# Patient Record
Sex: MALE | Race: WHITE | Employment: FULL TIME | URBAN - METROPOLITAN AREA
[De-identification: names, ages, dates, MRNs, and addresses within clinical notes are randomized per-mention and may not be internally consistent; named-entity substitution may affect disease eponyms.]

---

## 2022-08-02 NOTE — PROGRESS NOTES
HPI: Mirlande Jason (: 1960) is a 63 y.o. male, patient, here for evaluation of the following chief complaint(s):    No chief complaint on file. Patient is seen today to evaluate his left hand. The patient has a history of Dupuytren contracture. He is undergone a right small finger palmar digital fasciectomy about 7 to 8 years ago. He has a 15 degree MP and 20 to 25 degree PIP contracture of the left small finger. He has a thickened pretendinous cord with nodule formation to the small finger ray. He this does affect his ability to place his hand flat on the tabletop surface and at times he has trouble placing his hand in a pocket or in double glove. He denies any fall or injury or any triggering. Vitals: There were no vitals taken for this visit. There is no height or weight on file to calculate BMI. Not on File    No current outpatient medications on file. No current facility-administered medications for this visit. No past medical history on file. No past surgical history on file. No family history on file. Review of Systems   All other systems reviewed and are negative. Physical Exam    Patient demonstrates good elbow, forearm, wrist and hand range of motion. He has the slightest residual contracture involving the right small finger where he had prior surgery 7 to 8 years ago. Overall good result. Left small finger had developed cord formation with 15 degree MP and 20 to 25 degree PIP contracture. And this occurred after having a collagenase injection in . Imaging:    XR Results (most recent):  No results found for this or any previous visit. ASSESSMENT/PLAN:  Below is the assessment and plan developed based on review of pertinent history, physical exam, labs, studies, and medications. Patient's examination was consistent with Dupuytren contracture involving the left more than the right small finger.   He had a collagenase injection for the left small finger about a year and a half ago in 2021. He had also had a right small finger palmar digital fasciectomy 7-8 years ago that was more successful. The left small finger has effectively recurred. I reviewed treatment options and answered his questions. He deferred repeat collagenase and prefers now to proceed with a left small finger palmar digital fasciectomy with joint release. I reviewed risks that include but not limited to stiffness, pain, nerve or tendon damage and incomplete relief of pain and stiffness. Arrangements can made for this to be performed in outpatient basis soon as possible. 1. Dupuytren's contracture of right hand      No follow-ups on file. An electronic signature was used to authenticate this note.   -- Petra Beck MD

## 2022-08-03 ENCOUNTER — OFFICE VISIT (OUTPATIENT)
Dept: ORTHOPEDIC SURGERY | Age: 62
End: 2022-08-03
Payer: COMMERCIAL

## 2022-08-03 DIAGNOSIS — M72.0 DUPUYTREN'S CONTRACTURE OF LEFT HAND: Primary | ICD-10-CM

## 2022-08-03 PROCEDURE — 99213 OFFICE O/P EST LOW 20 MIN: CPT | Performed by: ORTHOPAEDIC SURGERY

## 2022-08-03 RX ORDER — LOSARTAN POTASSIUM 25 MG/1
TABLET ORAL 2 TIMES DAILY
COMMUNITY

## 2022-08-03 NOTE — PATIENT INSTRUCTIONS
Dupuytren's Contracture: Care Instructions  Your Care Instructions     In Dupuytren's contracture, the fingers become stiff and curl toward the palm. It is caused by thick tissue that grows under the skin in the palm of the hand. Sometimes the condition affects the palm but not the fingers. If the tissue gets thicker and affects one or more fingers, it may limit movement of your fingers and hand. Sometimes the condition can occur in the soles of the feet. The cause of Dupuytren's disease is not known. Dupuytren's disease may get worse slowly. If you have mild Dupuytren's disease, you may be able to keep your fingers moving with regular stretching. Surgery usually helps in severe cases. However, Dupuytren's disease can come back. Follow-up care is a key part of your treatment and safety. Be sure to make and go to all appointments, and call your doctor if you are having problems. It's also a good idea to know your test results and keep a list of the medicines you take. How can you care for yourself at home? Follow your doctor's advice for physical or occupational therapy and exercises to put your fingers and hand through a range of motion. Two times a day, massage your hand and gently stretch the fingers back. This can get rid of tightness and help keep your fingers flexible. Try to avoid curling your hand tightly. For example, use utensils and tools that have larger hand . When should you call for help? Call your doctor now or seek immediate medical care if:    You have numbness in your fingers. You have a wound or sore on your finger or palm. Your hand or fingers get worse. Watch closely for changes in your health, and be sure to contact your doctor if you have any problems. Where can you learn more? Go to http://www.gray.com/  Enter T888 in the search box to learn more about \"Dupuytren's Contracture: Care Instructions. \"  Current as of: July 1, 2021 Content Version: 13.2  © 8205-5992 [employer]. Care instructions adapted under license by [employer] (which disclaims liability or warranty for this information). If you have questions about a medical condition or this instruction, always ask your healthcare professional. [address] any warranty or liability for your use of this information.

## 2022-08-10 DIAGNOSIS — M72.0 DUPUYTREN'S CONTRACTURE OF LEFT HAND: Primary | ICD-10-CM

## 2022-08-26 DIAGNOSIS — M72.0 DUPUYTREN'S CONTRACTURE OF LEFT HAND: Primary | ICD-10-CM

## 2022-08-26 RX ORDER — HYDROCODONE BITARTRATE AND ACETAMINOPHEN 5; 325 MG/1; MG/1
1 TABLET ORAL
Qty: 15 TABLET | Refills: 0 | Status: SHIPPED | OUTPATIENT
Start: 2022-08-26 | End: 2022-08-29

## 2022-08-31 NOTE — PROGRESS NOTES
HPI: Natali Zhou (: 1960) is a 63 y.o. male, patient, here for evaluation of the following chief complaint(s):    No chief complaint on file. Patient is seen today to evaluate his left hand. The patient has a history of Dupuytren contracture. He is undergone a right small finger palmar digital fasciectomy about 7 to 8 years ago. He has a 15 degree MP and 20 to 25 degree PIP contracture of the left small finger. He has a thickened pretendinous cord with nodule formation to the small finger ray. He this does affect his ability to place his hand flat on the tabletop surface and at times he has trouble placing his hand in a pocket or in double glove. He denies any fall or injury or any triggering. He underwent a left palmar digital fasciectomy small finger ray including MP and PIP joint releases on 2022. Vitals: There were no vitals taken for this visit. There is no height or weight on file to calculate BMI. No Known Allergies    Current Outpatient Medications   Medication Sig    losartan (COZAAR) 25 mg tablet Take  by mouth two (2) times a day. No current facility-administered medications for this visit. No past medical history on file. No past surgical history on file. No family history on file. Review of Systems   All other systems reviewed and are negative. Physical Exam    He has had the slightest residual contracture involving the right small finger where he had prior surgery 7 to 8 years ago. Overall good result. Left small finger had developed cord formation with 15 degree MP and 20 to 25 degree PIP contracture. And this occurred after having a collagenase injection in . Postoperatively the left small finger wound is healing well and there is full extension of the MP and PIP joints. There is good sensation refill. Good alignment.     Imaging:    XR Results (most recent):  No results found for this or any previous visit.        ASSESSMENT/PLAN:  Below is the assessment and plan developed based on review of pertinent history, physical exam, labs, studies, and medications. Patient's examination was consistent with Dupuytren contracture involving the left more than the right small finger. He had a collagenase injection for the left small finger about a year and a half ago in 2021. He had also had a right small finger palmar digital fasciectomy 7-8 years ago that was more successful. The left small finger has effectively recurred. I reviewed treatment options and answered his questions. He deferred repeat collagenase and prefers now to proceed with a left small finger palmar digital fasciectomy with joint release. He underwent a left palmar digital fasciectomy small finger ray including MP and PIP joint releases on 8/29/2022. He may continue with nighttime splinting and daytime motion then strength recovery. New gauze dressing and Ace bandage was applied and there is good small finger resting extension. Follow-up in 10 to 14 days for wound assessment suture removal.  He will be out of work currently planned until around 8/19/2022      1. Dupuytren's contracture of left hand  2. Status post Dupuytren's fasciectomy      No follow-ups on file. An electronic signature was used to authenticate this note.   -- Chyna Hernandez MD

## 2022-09-01 ENCOUNTER — OFFICE VISIT (OUTPATIENT)
Dept: ORTHOPEDIC SURGERY | Age: 62
End: 2022-09-01
Payer: COMMERCIAL

## 2022-09-01 DIAGNOSIS — M72.0 DUPUYTREN'S CONTRACTURE OF LEFT HAND: Primary | ICD-10-CM

## 2022-09-01 DIAGNOSIS — Z98.890 STATUS POST DUPUYTREN'S FASCIECTOMY: ICD-10-CM

## 2022-09-01 PROCEDURE — 99024 POSTOP FOLLOW-UP VISIT: CPT | Performed by: ORTHOPAEDIC SURGERY

## 2022-09-01 NOTE — LETTER
9/1/2022    Patient: Rylie Michael   YOB: 1960   Date of Visit: 9/1/2022     Juve Mcdonald MD  [place] [address]  Via Fax: [phone]    Dear Juve Mcdonald MD,      Thank you for referring Mr. Rylie Michael to [facility] for evaluation. My notes for this consultation are attached. If you have questions, please do not hesitate to call me. I look forward to following your patient along with you.       Sincerely,    Mahamed Solano MD

## 2022-09-13 NOTE — PROGRESS NOTES
HPI: Ignacia Witt (: 1960) is a 63 y.o. male, patient, here for evaluation of the following chief complaint(s):    Surgical Follow-up (Left palmar digital fasciectomy small finger with MP and PIP joint releases on 22)  Patient is seen today to evaluate his left hand. The patient has a history of Dupuytren contracture. He is undergone a right small finger palmar digital fasciectomy about 7 to 8 years ago. He has a 15 degree MP and 20 to 25 degree PIP contracture of the left small finger. He has a thickened pretendinous cord with nodule formation to the small finger ray. He this does affect his ability to place his hand flat on the tabletop surface and at times he has trouble placing his hand in a pocket or in double glove. He denies any fall or injury or any triggering. He underwent a left palmar digital fasciectomy small finger ray including MP and PIP joint releases on 2022. He underwent suture removal on 2022. Vitals: There were no vitals taken for this visit. There is no height or weight on file to calculate BMI. No Known Allergies    Current Outpatient Medications   Medication Sig    losartan (COZAAR) 25 mg tablet Take  by mouth two (2) times a day. No current facility-administered medications for this visit. History reviewed. No pertinent past medical history. History reviewed. No pertinent surgical history. History reviewed. No pertinent family history. Review of Systems   All other systems reviewed and are negative. Physical Exam    He has had the slightest residual contracture involving the right small finger where he had prior surgery 7 to 8 years ago. Overall good result. Left small finger had developed cord formation with 15 degree MP and 20 to 25 degree PIP contracture. And this occurred after having a collagenase injection in .       Postoperatively the left small finger wound is healing well and there is full extension of the MP and PIP joints. There is good sensation refill. Good alignment. Imaging:    XR Results (most recent):  No new x-rays today. ASSESSMENT/PLAN:  Below is the assessment and plan developed based on review of pertinent history, physical exam, labs, studies, and medications. Patient's examination was consistent with Dupuytren contracture involving the left more than the right small finger. He had a collagenase injection for the left small finger about a year and a half ago in 2021. He had also had a right small finger palmar digital fasciectomy 7-8 years ago that was more successful. The left small finger has effectively recurred. I reviewed treatment options and answered his questions. He deferred repeat collagenase and prefers now to proceed with a left small finger palmar digital fasciectomy with joint release. He underwent a left palmar digital fasciectomy small finger ray including MP and PIP joint releases on 8/29/2022. He may continue with nighttime splinting and daytime motion then strength recovery. He return for suture removal on 9/14/2022. He may continue with scar massage, desensitization, motion and strengthening. Overall he has good extension and flexion is pleased with his recovery. He deferred the need for outpatient therapy. He is returning to work on 9/19/2022. Follow-up in 3 to 4 weeks    1. Dupuytren's contracture of left hand  2. Status post Dupuytren's fasciectomy      Return in about 4 weeks (around 10/12/2022). An electronic signature was used to authenticate this note.   -- Gustabo Higgins MD

## 2022-09-14 ENCOUNTER — OFFICE VISIT (OUTPATIENT)
Dept: ORTHOPEDIC SURGERY | Age: 62
End: 2022-09-14
Payer: COMMERCIAL

## 2022-09-14 ENCOUNTER — OFFICE VISIT (OUTPATIENT)
Dept: ORTHOPEDIC SURGERY | Age: 62
End: 2022-09-14

## 2022-09-14 VITALS — HEIGHT: 70 IN | BODY MASS INDEX: 29.35 KG/M2 | WEIGHT: 205 LBS

## 2022-09-14 DIAGNOSIS — M72.0 DUPUYTREN'S CONTRACTURE OF LEFT HAND: Primary | ICD-10-CM

## 2022-09-14 DIAGNOSIS — M25.512 LEFT SHOULDER PAIN, UNSPECIFIED CHRONICITY: Primary | ICD-10-CM

## 2022-09-14 DIAGNOSIS — M75.42 IMPINGEMENT SYNDROME OF LEFT SHOULDER: ICD-10-CM

## 2022-09-14 DIAGNOSIS — Z98.890 STATUS POST DUPUYTREN'S FASCIECTOMY: ICD-10-CM

## 2022-09-14 PROCEDURE — 99214 OFFICE O/P EST MOD 30 MIN: CPT | Performed by: ORTHOPAEDIC SURGERY

## 2022-09-14 PROCEDURE — 20610 DRAIN/INJ JOINT/BURSA W/O US: CPT | Performed by: ORTHOPAEDIC SURGERY

## 2022-09-14 PROCEDURE — 99024 POSTOP FOLLOW-UP VISIT: CPT | Performed by: ORTHOPAEDIC SURGERY

## 2022-09-14 RX ORDER — TRIAMCINOLONE ACETONIDE 40 MG/ML
40 INJECTION, SUSPENSION INTRA-ARTICULAR; INTRAMUSCULAR ONCE
Status: COMPLETED | OUTPATIENT
Start: 2022-09-14 | End: 2022-09-14

## 2022-09-14 RX ORDER — BUPIVACAINE HYDROCHLORIDE 7.5 MG/ML
3 INJECTION, SOLUTION EPIDURAL; RETROBULBAR ONCE
Status: COMPLETED | OUTPATIENT
Start: 2022-09-14 | End: 2022-09-14

## 2022-09-14 RX ADMIN — BUPIVACAINE HYDROCHLORIDE 22.5 MG: 7.5 INJECTION, SOLUTION EPIDURAL; RETROBULBAR at 09:47

## 2022-09-14 RX ADMIN — TRIAMCINOLONE ACETONIDE 40 MG: 40 INJECTION, SUSPENSION INTRA-ARTICULAR; INTRAMUSCULAR at 09:47

## 2022-09-14 NOTE — LETTER
9/14/2022    Patient: Olga Miner   YOB: 1960   Date of Visit: 9/14/2022     Som Steward MD  [place] [address]  Via Fax: [phone]    Dear Som Steward MD,      Thank you for referring Mr. Olga Miner to [facility] for evaluation. My notes for this consultation are attached. If you have questions, please do not hesitate to call me. I look forward to following your patient along with you.       Sincerely,    Williams Xiong, DO

## 2022-09-14 NOTE — LETTER
9/14/2022    Patient: Natali Anthony   YOB: 1960   Date of Visit: 9/14/2022     Dee Maurice MD  [place] [address]  Via Fax: [phone]    Dear Dee Maurice MD,      Thank you for referring Mr. Natali Anthony to [facility] for evaluation. My notes for this consultation are attached. If you have questions, please do not hesitate to call me. I look forward to following your patient along with you.       Sincerely,    Aida Hollis MD

## 2022-09-14 NOTE — PROGRESS NOTES
Priscilla Waddell (: 1960) is a 63 y.o. male, established patient, here for evaluation of the following chief complaint(s):  Shoulder Pain       ASSESSMENT/PLAN:  Below is the assessment and plan developed based on review of pertinent history, physical exam, labs, studies, and medications. We discussed his left shoulder pain. He does have true impingement symptoms although I cannot rule out partial rotator cuff tear. We discussed possibility of MRI if he continues with symptoms despite conservative treatment. For today he elected to try corticosteroid injection and will try a trial of physical therapy if he continues with pain. We discussed the risks and benefits of injection and informed consent was obtained. After a sterile preparation, 4 cc of bupivicaine and 40 mg of Kenalog were injected into the left shoulder. The patient tolerated the procedure well and there were no complications. Post injection pain, blood sugar elevation, skin discoloration, fatty atrophy and the signs of infection were discussed in detail. The patient was instructed to contact us if there were any questions or concerns prior to their follow up appointment. 1. Left shoulder pain, unspecified chronicity  -     XR SHOULDER LT AP/LAT MIN 2 V; Future  -     REFERRAL TO PHYSICAL THERAPY  -     triamcinolone acetonide (KENALOG-40) 40 mg/mL injection 40 mg; 40 mg, Intra artICUlar, ONCE, 1 dose, On 22 at 1000  -     bupivacaine (PF) (MARCAINE) 0.75 % (7.5 mg/mL) injection 22.5 mg; 22.5 mg (3 mL), Intra artICUlar, ONCE, 1 dose, On 22 at 1000  2. Impingement syndrome of left shoulder  -     REFERRAL TO PHYSICAL THERAPY      Return in about 6 weeks (around 10/26/2022), or if symptoms worsen or fail to improve. SUBJECTIVE/OBJECTIVE:  Priscilla Waddell (: 1960) is a 63 y.o. male.     He notes aching pain and occasional sharp pains in the left shoulder that have been ongoing since a recent hand surgery. He believes it may have been due to positioning of the hand while he was getting nerve block. He notes that certain motions especially reaching behind his back causes sharp pain. He has had occasional weakness and clicking        No Known Allergies    Current Outpatient Medications   Medication Sig    losartan (COZAAR) 25 mg tablet Take  by mouth two (2) times a day. Current Facility-Administered Medications   Medication    triamcinolone acetonide (KENALOG-40) 40 mg/mL injection 40 mg    bupivacaine (PF) (MARCAINE) 0.75 % (7.5 mg/mL) injection 22.5 mg       Social History     Socioeconomic History    Marital status:      Spouse name: Not on file    Number of children: Not on file    Years of education: Not on file    Highest education level: Not on file   Occupational History    Not on file   Tobacco Use    Smoking status: Not on file    Smokeless tobacco: Not on file   Substance and Sexual Activity    Alcohol use: Not on file    Drug use: Not on file    Sexual activity: Not on file   Other Topics Concern    Not on file   Social History Narrative    Not on file     Social Determinants of Health     Financial Resource Strain: Not on file   Food Insecurity: Not on file   Transportation Needs: Not on file   Physical Activity: Not on file   Stress: Not on file   Social Connections: Not on file   Intimate Partner Violence: Not on file   Housing Stability: Not on file       History reviewed. No pertinent surgical history. History reviewed. No pertinent family history. REVIEW OF SYSTEMS:  ROS    Positive for: Musculoskeletal  Last edited by Zulay Stockton on 9/14/2022  9:16 AM.        Patient denies any recent fever, chills, nausea, vomiting, chest pain, or shortness of breath. Vitals:  Ht 5' 10\" (1.778 m)   Wt 205 lb (93 kg)   BMI 29.41 kg/m²    Body mass index is 29.41 kg/m². PHYSICAL EXAM:  General exam: Patient is awake, alert, and oriented x3. Well-appearing.   No acute distress. Ambulates with a normal gait. Left shoulder: Neurovascular and sensory intact. There is tenderness to palpation at the anterior lateral shoulder. There is limited passive and active overhead range of motion. Pain is noted with impingement testing including Arriola exam.  Pain and weakness 4/5 is noted with rotator cuff strength testing including resisted abduction and resisted external rotation. Normal stability. There is some tenderness palpation at the [facility] joint and pain with crossarm exam.        IMAGING:    XR Results (most recent):  Results from Appointment encounter on 09/14/22    XR SHOULDER LT AP/LAT MIN 2 V    Narrative  X-rays of the left shoulder 3 views done today show evidence of maintained glenohumeral joint space with small early osteophyte formation at the inferior glenoid. Type II acromion. AC joint space narrowing         Orders Placed This Encounter    XR SHOULDER LT AP/LAT MIN 2 V     Standing Status:   Future     Number of Occurrences:   1     Standing Expiration Date:   11/14/2022    REFERRAL TO PHYSICAL THERAPY     Referral Priority:   Routine     Referral Type:   PT/OT/ST     Referral Reason:   Specialty Services Required     Number of Visits Requested:   1    triamcinolone acetonide (KENALOG-40) 40 mg/mL injection 40 mg    bupivacaine (PF) (MARCAINE) 0.75 % (7.5 mg/mL) injection 22.5 mg              An electronic signature was used to authenticate this note.   -- Ann Alvarenga DO

## 2022-09-19 DIAGNOSIS — M25.512 LEFT SHOULDER PAIN, UNSPECIFIED CHRONICITY: ICD-10-CM

## 2022-09-19 DIAGNOSIS — M75.42 IMPINGEMENT SYNDROME OF LEFT SHOULDER: Primary | ICD-10-CM

## 2022-10-11 NOTE — PROGRESS NOTES
HPI: Wil Catalan (: 1960) is a 63 y.o. male, patient, here for evaluation of the following chief complaint(s):    No chief complaint on file. Patient is seen today to evaluate his left hand. The patient has a history of Dupuytren contracture. He is undergone a right small finger palmar digital fasciectomy about 7 to 8 years ago. He has a 15 degree MP and 20 to 25 degree PIP contracture of the left small finger. He has a thickened pretendinous cord with nodule formation to the small finger ray. He this does affect his ability to place his hand flat on the tabletop surface and at times he has trouble placing his hand in a pocket or in double glove. He denies any fall or injury or any triggering. He underwent a left palmar digital fasciectomy small finger ray including MP and PIP joint releases on 2022. He underwent suture removal on 2022. Vitals: There were no vitals taken for this visit. There is no height or weight on file to calculate BMI. No Known Allergies    Current Outpatient Medications   Medication Sig    losartan (COZAAR) 25 mg tablet Take  by mouth two (2) times a day. No current facility-administered medications for this visit. History reviewed. No pertinent past medical history. History reviewed. No pertinent surgical history. History reviewed. No pertinent family history. Review of Systems   All other systems reviewed and are negative. Physical Exam    He has had the slightest residual contracture involving the right small finger where he had prior surgery 7 to 8 years ago. Overall good result. Left small finger had developed cord formation with 15 degree MP and 20 to 25 degree PIP contracture. And this occurred after having a collagenase injection in . Postoperatively the left small finger wound is healing well and there is full extension of the MP and PIP joints. There is good sensation refill.   Good alignment. Imaging:    XR Results (most recent):  No new x-rays today. ASSESSMENT/PLAN:  Below is the assessment and plan developed based on review of pertinent history, physical exam, labs, studies, and medications. Patient's examination was consistent with Dupuytren contracture involving the left more than the right small finger. He had a collagenase injection for the left small finger about a year and a half ago in 2021. He had also had a right small finger palmar digital fasciectomy 7-8 years ago that was more successful. The left small finger has effectively recurred. I reviewed treatment options and answered his questions. He deferred repeat collagenase and prefers now to proceed with a left small finger palmar digital fasciectomy with joint release. He underwent a left palmar digital fasciectomy small finger ray including MP and PIP joint releases on 8/29/2022. He may continue with nighttime splinting and daytime motion then strength recovery. He return for suture removal on 9/14/2022. He may continue with scar massage, desensitization, motion and strengthening. Overall he has good extension and flexion is pleased with his recovery. He deferred the need for outpatient therapy. He is returning to work on 9/19/2022. Her healing continues to improve when assessed on 10/12/2022. He may continue with scar massage, desensitization, motion and strengthening. I plan to see him back in 6 weeks for likely for final visit. He has similar but milder Dupuytren's in the right palm with really no pain and technically he can achieve full extension but there is thickened pretendinous cord formation to the small finger that in theory could be removed if he were to develop a contracture and/or positive tabletop test.    1. Dupuytren's contracture of left hand  2. Status post Dupuytren's fasciectomy      Return in about 6 weeks (around 11/23/2022).     An electronic signature was used to authenticate this note.   -- Marito Jimenez MD

## 2022-10-12 ENCOUNTER — OFFICE VISIT (OUTPATIENT)
Dept: ORTHOPEDIC SURGERY | Age: 62
End: 2022-10-12
Payer: COMMERCIAL

## 2022-10-12 DIAGNOSIS — M72.0 DUPUYTREN'S CONTRACTURE OF LEFT HAND: Primary | ICD-10-CM

## 2022-10-12 DIAGNOSIS — Z98.890 STATUS POST DUPUYTREN'S FASCIECTOMY: ICD-10-CM

## 2022-10-12 PROCEDURE — 99024 POSTOP FOLLOW-UP VISIT: CPT | Performed by: ORTHOPAEDIC SURGERY

## 2022-10-12 NOTE — LETTER
10/12/2022    Patient: Ari Felix   YOB: 1960   Date of Visit: 10/12/2022     Bruce Silva MD  [place] [address]  Via Fax: [phone]    Dear Bruce Silva MD,      Thank you for referring Mr. Ari Felix to [facility] for evaluation. My notes for this consultation are attached. If you have questions, please do not hesitate to call me. I look forward to following your patient along with you.       Sincerely,    Nu Estrella MD

## 2024-01-12 SDOH — ECONOMIC STABILITY: INCOME INSECURITY: HOW HARD IS IT FOR YOU TO PAY FOR THE VERY BASICS LIKE FOOD, HOUSING, MEDICAL CARE, AND HEATING?: NOT HARD AT ALL

## 2024-01-12 SDOH — ECONOMIC STABILITY: FOOD INSECURITY: WITHIN THE PAST 12 MONTHS, THE FOOD YOU BOUGHT JUST DIDN'T LAST AND YOU DIDN'T HAVE MONEY TO GET MORE.: NEVER TRUE

## 2024-01-12 SDOH — ECONOMIC STABILITY: FOOD INSECURITY: WITHIN THE PAST 12 MONTHS, YOU WORRIED THAT YOUR FOOD WOULD RUN OUT BEFORE YOU GOT MONEY TO BUY MORE.: NEVER TRUE

## 2024-01-12 SDOH — ECONOMIC STABILITY: HOUSING INSECURITY
IN THE LAST 12 MONTHS, WAS THERE A TIME WHEN YOU DID NOT HAVE A STEADY PLACE TO SLEEP OR SLEPT IN A SHELTER (INCLUDING NOW)?: NO

## 2024-01-12 SDOH — ECONOMIC STABILITY: TRANSPORTATION INSECURITY
IN THE PAST 12 MONTHS, HAS LACK OF TRANSPORTATION KEPT YOU FROM MEETINGS, WORK, OR FROM GETTING THINGS NEEDED FOR DAILY LIVING?: NO

## 2024-01-15 ENCOUNTER — OFFICE VISIT (OUTPATIENT)
Age: 64
End: 2024-01-15
Payer: COMMERCIAL

## 2024-01-15 VITALS
WEIGHT: 216 LBS | HEART RATE: 57 BPM | RESPIRATION RATE: 17 BRPM | TEMPERATURE: 97.2 F | DIASTOLIC BLOOD PRESSURE: 89 MMHG | OXYGEN SATURATION: 94 % | SYSTOLIC BLOOD PRESSURE: 136 MMHG | BODY MASS INDEX: 30.92 KG/M2 | HEIGHT: 70 IN

## 2024-01-15 DIAGNOSIS — E78.00 HYPERCHOLESTEROLEMIA: ICD-10-CM

## 2024-01-15 DIAGNOSIS — I10 PRIMARY HYPERTENSION: Primary | ICD-10-CM

## 2024-01-15 DIAGNOSIS — R73.03 PREDIABETES: ICD-10-CM

## 2024-01-15 DIAGNOSIS — Z12.5 SCREENING FOR PROSTATE CANCER: ICD-10-CM

## 2024-01-15 DIAGNOSIS — R35.1 NOCTURIA: ICD-10-CM

## 2024-01-15 PROCEDURE — 99203 OFFICE O/P NEW LOW 30 MIN: CPT | Performed by: FAMILY MEDICINE

## 2024-01-15 PROCEDURE — 3079F DIAST BP 80-89 MM HG: CPT | Performed by: FAMILY MEDICINE

## 2024-01-15 PROCEDURE — 3075F SYST BP GE 130 - 139MM HG: CPT | Performed by: FAMILY MEDICINE

## 2024-01-15 RX ORDER — LOSARTAN POTASSIUM 50 MG/1
50 TABLET ORAL 2 TIMES DAILY
Qty: 180 TABLET | Refills: 3 | Status: SHIPPED | OUTPATIENT
Start: 2024-01-15

## 2024-01-15 RX ORDER — LOSARTAN POTASSIUM 50 MG/1
50 TABLET ORAL 2 TIMES DAILY
COMMUNITY
Start: 2023-11-20 | End: 2024-01-15 | Stop reason: SDUPTHER

## 2024-01-15 SDOH — ECONOMIC STABILITY: FOOD INSECURITY: WITHIN THE PAST 12 MONTHS, THE FOOD YOU BOUGHT JUST DIDN'T LAST AND YOU DIDN'T HAVE MONEY TO GET MORE.: NEVER TRUE

## 2024-01-15 SDOH — ECONOMIC STABILITY: FOOD INSECURITY: WITHIN THE PAST 12 MONTHS, YOU WORRIED THAT YOUR FOOD WOULD RUN OUT BEFORE YOU GOT MONEY TO BUY MORE.: NEVER TRUE

## 2024-01-15 SDOH — ECONOMIC STABILITY: INCOME INSECURITY: HOW HARD IS IT FOR YOU TO PAY FOR THE VERY BASICS LIKE FOOD, HOUSING, MEDICAL CARE, AND HEATING?: NOT HARD AT ALL

## 2024-01-15 ASSESSMENT — PATIENT HEALTH QUESTIONNAIRE - PHQ9
SUM OF ALL RESPONSES TO PHQ QUESTIONS 1-9: 0
SUM OF ALL RESPONSES TO PHQ9 QUESTIONS 1 & 2: 0
SUM OF ALL RESPONSES TO PHQ QUESTIONS 1-9: 0
SUM OF ALL RESPONSES TO PHQ QUESTIONS 1-9: 0
2. FEELING DOWN, DEPRESSED OR HOPELESS: 0
SUM OF ALL RESPONSES TO PHQ QUESTIONS 1-9: 0
1. LITTLE INTEREST OR PLEASURE IN DOING THINGS: 0

## 2024-01-15 NOTE — PROGRESS NOTES
Chief Complaint   Patient presents with    Established New Doctor       \"Have you been to the ER, urgent care clinic since your last visit?  Hospitalized since your last visit?\"    NO    “Have you seen or consulted any other health care providers outside of [facility] since your last visit?”    NO    “Have you had a colorectal cancer screening such as a colonoscopy/FIT/Cologuard?    YES            Vitals:    01/15/24 1017   BP: 136/89   Site: Left Upper Arm   Position: Sitting   Cuff Size: Large Adult   Pulse: 57   Resp: 17   Temp: 97.2 °F (36.2 °C)   TempSrc: Temporal   SpO2: 94%   Weight: 98 kg (216 lb)   Height: 1.778 m (5' 10\")        Health Maintenance Due   Topic Date Due    Diabetes screen  Never done    Lipids  Never done    Colorectal Cancer Screen  Never done        The patient, Robbin Waller, identity was verified by name and

## 2024-01-15 NOTE — PROGRESS NOTES
Robbin Waller (:  1960) is a 63 y.o. male,Established patient, here for evaluation of the following chief complaint(s):  Established New Doctor  Presents as a new patient with history of hypertension hypercholesterolemia last PSA level of  at 3.1 with a complaint of nocturia,  present for Bp check , compliant w/ the bp meds, a low salt diet, an  active life style, patient does obtain the bp at home ,  today the pt denies Chest Pain, has no legs swelling no lightheadedness,the pat has not been feeling anxious, and  Has not been feeling stressed out, otherwise feeling better since the last visit    Constitutional: no chills and fever,nad     HENT: no ear pain and nosebleeds. No blurred vision,   Respiratory: no shortness of breath, wheezing cough nor sore throat.    Cardiovascular: Has no chest pain, leg swelling ,and racing heart .   Gastrointestinal: No constipation, diarrhea, nausea and vomiting.   Genitourinary: No frequency.   Musculoskeletal: +++for multiple joint pain.   Skin: no itching, pimples   Neurological: Negative for dizziness, no tremors  Psychiatric/Behavioral: Negative for depression,  not nervous/anxious.        General:  alert cooperative appears stated for the age  HEENT; normocephalic and atraumatic PERRLA extraocular motor intact normal tympanic membrane normal external ear bilaterally, mucosal normal no drainage  Neck: supple no adenopathy no JVD no bruit  Lungs: Clear to auscultation bilaterally no rales rhonchi or wheezes  Heart: Normal regular S1-S2 ,  no murmur  Breast exam deferred  Abdomen: Soft nontender normal bowel sounds   Extremities: Normal range of motion no point tenderness normal pulses no edema  Pelvic/: No lesion, no lymphadenopathy  Skin: Normal no lesion no rash         ASSESSMENT/PLAN:  1. Primary hypertension  -     Lipid Panel; Future  -     Hemoglobin A1C; Future  -     losartan (COZAAR) 50 MG tablet; Take 1 tablet by mouth in the morning and at

## 2024-01-24 ENCOUNTER — OFFICE VISIT (OUTPATIENT)
Age: 64
End: 2024-01-24

## 2024-01-24 DIAGNOSIS — Z12.5 SCREENING FOR PROSTATE CANCER: ICD-10-CM

## 2024-01-24 DIAGNOSIS — E78.00 HYPERCHOLESTEROLEMIA: ICD-10-CM

## 2024-01-24 DIAGNOSIS — I10 PRIMARY HYPERTENSION: ICD-10-CM

## 2024-01-24 DIAGNOSIS — R73.03 PREDIABETES: ICD-10-CM

## 2024-01-25 LAB
ALBUMIN SERPL-MCNC: 4 G/DL (ref 3.5–5)
ALBUMIN/GLOB SERPL: 1.5 (ref 1.1–2.2)
ALP SERPL-CCNC: 61 U/L (ref 45–117)
ALT SERPL-CCNC: 39 U/L (ref 12–78)
ANION GAP SERPL CALC-SCNC: 8 MMOL/L (ref 5–15)
AST SERPL-CCNC: 16 U/L (ref 15–37)
BILIRUB SERPL-MCNC: 0.6 MG/DL (ref 0.2–1)
BUN SERPL-MCNC: 19 MG/DL (ref 6–20)
BUN/CREAT SERPL: 15 (ref 12–20)
CALCIUM SERPL-MCNC: 8.6 MG/DL (ref 8.5–10.1)
CHLORIDE SERPL-SCNC: 109 MMOL/L (ref 97–108)
CHOLEST SERPL-MCNC: 209 MG/DL
CO2 SERPL-SCNC: 25 MMOL/L (ref 21–32)
CREAT SERPL-MCNC: 1.25 MG/DL (ref 0.7–1.3)
ERYTHROCYTE [DISTWIDTH] IN BLOOD BY AUTOMATED COUNT: 12.2 % (ref 11.5–14.5)
EST. AVERAGE GLUCOSE BLD GHB EST-MCNC: 123 MG/DL
GLOBULIN SER CALC-MCNC: 2.7 G/DL (ref 2–4)
GLUCOSE SERPL-MCNC: 110 MG/DL (ref 65–100)
HBA1C MFR BLD: 5.9 % (ref 4–5.6)
HCT VFR BLD AUTO: 41.7 % (ref 36.6–50.3)
HDLC SERPL-MCNC: 52 MG/DL
HDLC SERPL: 4 (ref 0–5)
HGB BLD-MCNC: 14.4 G/DL (ref 12.1–17)
LDLC SERPL CALC-MCNC: 127.8 MG/DL (ref 0–100)
MCH RBC QN AUTO: 30.5 PG (ref 26–34)
MCHC RBC AUTO-ENTMCNC: 34.5 G/DL (ref 30–36.5)
MCV RBC AUTO: 88.3 FL (ref 80–99)
NRBC # BLD: 0 K/UL (ref 0–0.01)
NRBC BLD-RTO: 0 PER 100 WBC
PLATELET # BLD AUTO: 222 K/UL (ref 150–400)
PMV BLD AUTO: 10.7 FL (ref 8.9–12.9)
POTASSIUM SERPL-SCNC: 4.5 MMOL/L (ref 3.5–5.1)
PROT SERPL-MCNC: 6.7 G/DL (ref 6.4–8.2)
PSA SERPL-MCNC: 3.4 NG/ML (ref 0.01–4)
RBC # BLD AUTO: 4.72 M/UL (ref 4.1–5.7)
SODIUM SERPL-SCNC: 142 MMOL/L (ref 136–145)
TRIGL SERPL-MCNC: 146 MG/DL
VLDLC SERPL CALC-MCNC: 29.2 MG/DL
WBC # BLD AUTO: 6.8 K/UL (ref 4.1–11.1)

## 2024-07-12 ENCOUNTER — CLINICAL DOCUMENTATION (OUTPATIENT)
Facility: HOSPITAL | Age: 64
End: 2024-07-12

## 2024-07-12 ENCOUNTER — HOSPITAL ENCOUNTER (OUTPATIENT)
Facility: HOSPITAL | Age: 64
Discharge: HOME | End: 2024-07-15
Payer: COMMERCIAL

## 2024-07-12 VITALS
SYSTOLIC BLOOD PRESSURE: 138 MMHG | HEART RATE: 62 BPM | HEIGHT: 70 IN | BODY MASS INDEX: 29.92 KG/M2 | RESPIRATION RATE: 18 BRPM | DIASTOLIC BLOOD PRESSURE: 84 MMHG | WEIGHT: 209 LBS

## 2024-07-12 DIAGNOSIS — C61 PROSTATE CANCER (HCC): Primary | ICD-10-CM

## 2024-07-12 PROCEDURE — 99202 OFFICE O/P NEW SF 15 MIN: CPT

## 2024-07-12 ASSESSMENT — PAIN SCALES - GENERAL: PAINLEVEL_OUTOF10: 0

## 2024-07-12 NOTE — PROGRESS NOTES
NCCN Distress Thermometer    Date Screening Completed: 7/12/24    Screening Declined:  [] Yes    Number that best describes how much distress you've experienced in the past week, including today?  0 [x] - No distress 1 []      2 []      3 []      4 []       5 []       6 []      7 []      8 []      9 []       10 [] - Extreme distress    PROBLEM LIST  Have you had concerns about any of the items below in the past week, including today?      Physical Concerns Practical Concerns   [] Pain [] Taking care of myself    [x] Sleep [] Taking care of others    [] Fatigue [] Work   [] Tobacco use  [] School   [] Substance use  [] Housing   [] Memory or concentration [] Finances   [] Sexual health [] Insurance   [] Changes in eating  [] Transportation   [] Loss or change of physical abilities  []     [] Having enough food   Emotional Concerns [] Access to medicine   [] Worry or anxiety [] Treatment decisions   [] Sadness or depression    [] Loss of interest or enjoyment  Spiritual or Congregational Concerns   [] Grief or loss  [] Sense of meaning or purpose   [] Fear [] Changes in derrick or beliefs   [] Loneliness  [] Death, dying, or afterlife   [] Anger [] Conflict between beliefs and cancer treatments    [] Changes in appearance [] Relationship with the sacred   [] Feelings of worthlessness or being a burden [] Ritual or dietary needs        Social Concerns     [] Relationship with spouse or partner     [] Relationship with children    [] Relationship with family members     [] Relationship with friends or coworkers     [] Communication with health care team     [] Ability to have children     [] Prejudice or discrimination        Other Concerns: none listed    Patient received resource information and education:  [x] Yes  [] No

## 2024-07-12 NOTE — CONSULTS
developmental delays, altered mood or affect, or disorientation.  Head: Normocephalic, atraumatic  Eyes: No evidence of conjunctivitis or scleral abnormalities.  Skin: No evidence of blistering or rash.  Cardiovascular: No evidence of abnormal heart rate.  Respiratory: Breathing unlabored on room air without accessory muscle use.  Genitourinary: Prostate exam per urology.  Musculoskeletal: Normal muscle bulk. No deformity.  Neurologic: Grossly intact. Normal gait.  Psychiatric: Appropriate mood and affect. Good judgment and insight.  Hematologic/Lymphatic: No evidence of petechiae / purpura / ecchymosis.      Impression    Favorable intermediate risk prostate cancer, [place] 3 + 4 (in a total of 3/12 template cores), PSA 4.41 ng/mL, cT2a.      I had a long discussion with Mr. Waller and his wife about prostate cancer. We discussed that for favorable intermediate risk prostate cancer, I recommend consideration of the options listed below. Definitive radiation is oncologically equivalent to surgical prostatectomy.    - I think he is a reasonable active surveillance candidate (AS for intermediate risk reviewed by Adam ARMANDO 2020).   - External beam radiation, without androgen deprivation therapy   - Brachytherapy as sole modality  - Prostatectomy, if offered     We discussed the nature and rationale of radiotherapy for the treatment of prostate cancer. We reviewed the radiotherapy side effects that can occur during treatment and within a few months after completing treatment. These include but are not limited to mild fatigue, bladder and urethra irritation symptoms of frequency and urgency, mild burning with urination, a small risk of tiny amounts of bleeding in the urine, and the rectum and bowel irritation symptoms of slightly looser, urgent and more frequent bowel movements, and possible small amounts of bleeding from hemorrhoids. These side effects are generally mild, are easily managed during treatment, and

## 2024-12-03 RX ORDER — SULFAMETHOXAZOLE AND TRIMETHOPRIM 800; 160 MG/1; MG/1
1 TABLET ORAL 2 TIMES DAILY
Qty: 6 TABLET | Refills: 0 | Status: SHIPPED | OUTPATIENT
Start: 2024-12-03 | End: 2024-12-06

## 2024-12-05 ENCOUNTER — HOSPITAL ENCOUNTER (OUTPATIENT)
Facility: HOSPITAL | Age: 64
Discharge: HOME | End: 2024-12-08
Attending: RADIOLOGY

## 2024-12-13 ENCOUNTER — HOSPITAL ENCOUNTER (OUTPATIENT)
Facility: HOSPITAL | Age: 64
Discharge: HOME | End: 2024-12-16
Attending: RADIOLOGY

## 2024-12-13 ENCOUNTER — HOSPITAL ENCOUNTER (OUTPATIENT)
Age: 64
Discharge: HOME | End: 2024-12-16
Payer: COMMERCIAL

## 2024-12-13 DIAGNOSIS — C61 MALIGNANT NEOPLASM OF PROSTATE (HCC): ICD-10-CM

## 2024-12-13 PROCEDURE — 76498 UNLISTED MR PROCEDURE: CPT

## 2025-01-07 ENCOUNTER — HOSPITAL ENCOUNTER (OUTPATIENT)
Facility: HOSPITAL | Age: 65
Discharge: HOME | End: 2025-01-10
Attending: RADIOLOGY

## 2025-01-07 LAB
RAD ONC ARIA COURSE FIRST TREATMENT DATE: NORMAL
RAD ONC ARIA COURSE ID: NORMAL
RAD ONC ARIA COURSE INTENT: NORMAL
RAD ONC ARIA COURSE LAST TREATMENT DATE: NORMAL
RAD ONC ARIA COURSE SESSION NUMBER: 1
RAD ONC ARIA COURSE START DATE: NORMAL
RAD ONC ARIA COURSE TREATMENT ELAPSED DAYS: 0
RAD ONC ARIA PLAN FRACTIONS TREATED TO DATE: 1
RAD ONC ARIA PLAN ID: NORMAL
RAD ONC ARIA PLAN PRESCRIBED DOSE PER FRACTION: 8 GY
RAD ONC ARIA PLAN PRIMARY REFERENCE POINT: NORMAL
RAD ONC ARIA PLAN TOTAL FRACTIONS PRESCRIBED: 5
RAD ONC ARIA PLAN TOTAL PRESCRIBED DOSE: 4000 CGY
RAD ONC ARIA REFERENCE POINT DOSAGE GIVEN TO DATE: 7.25 GY
RAD ONC ARIA REFERENCE POINT DOSAGE GIVEN TO DATE: 8 GY
RAD ONC ARIA REFERENCE POINT DOSAGE GIVEN TO DATE: 8.07 GY
RAD ONC ARIA REFERENCE POINT ID: NORMAL
RAD ONC ARIA REFERENCE POINT SESSION DOSAGE GIVEN: 7.25 GY
RAD ONC ARIA REFERENCE POINT SESSION DOSAGE GIVEN: 8 GY
RAD ONC ARIA REFERENCE POINT SESSION DOSAGE GIVEN: 8.07 GY

## 2025-01-09 ENCOUNTER — HOSPITAL ENCOUNTER (OUTPATIENT)
Facility: HOSPITAL | Age: 65
Discharge: HOME | End: 2025-01-12
Attending: RADIOLOGY

## 2025-01-09 DIAGNOSIS — C61 PROSTATE CANCER (HCC): Primary | ICD-10-CM

## 2025-01-09 LAB
RAD ONC ARIA COURSE FIRST TREATMENT DATE: NORMAL
RAD ONC ARIA COURSE ID: NORMAL
RAD ONC ARIA COURSE INTENT: NORMAL
RAD ONC ARIA COURSE LAST TREATMENT DATE: NORMAL
RAD ONC ARIA COURSE SESSION NUMBER: 2
RAD ONC ARIA COURSE START DATE: NORMAL
RAD ONC ARIA COURSE TREATMENT ELAPSED DAYS: 2
RAD ONC ARIA PLAN FRACTIONS TREATED TO DATE: 2
RAD ONC ARIA PLAN ID: NORMAL
RAD ONC ARIA PLAN PRESCRIBED DOSE PER FRACTION: 8 GY
RAD ONC ARIA PLAN PRIMARY REFERENCE POINT: NORMAL
RAD ONC ARIA PLAN TOTAL FRACTIONS PRESCRIBED: 5
RAD ONC ARIA PLAN TOTAL PRESCRIBED DOSE: 4000 CGY
RAD ONC ARIA REFERENCE POINT DOSAGE GIVEN TO DATE: 14.5 GY
RAD ONC ARIA REFERENCE POINT DOSAGE GIVEN TO DATE: 16 GY
RAD ONC ARIA REFERENCE POINT DOSAGE GIVEN TO DATE: 16.14 GY
RAD ONC ARIA REFERENCE POINT ID: NORMAL
RAD ONC ARIA REFERENCE POINT SESSION DOSAGE GIVEN: 7.25 GY
RAD ONC ARIA REFERENCE POINT SESSION DOSAGE GIVEN: 8 GY
RAD ONC ARIA REFERENCE POINT SESSION DOSAGE GIVEN: 8.07 GY

## 2025-01-09 RX ORDER — TAMSULOSIN HYDROCHLORIDE 0.4 MG/1
0.4 CAPSULE ORAL
Qty: 30 CAPSULE | Refills: 3 | Status: SHIPPED | OUTPATIENT
Start: 2025-01-09

## 2025-01-09 ASSESSMENT — PATIENT HEALTH QUESTIONNAIRE - PHQ9
1. LITTLE INTEREST OR PLEASURE IN DOING THINGS: NOT AT ALL
SUM OF ALL RESPONSES TO PHQ QUESTIONS 1-9: 0
SUM OF ALL RESPONSES TO PHQ QUESTIONS 1-9: 0
SUM OF ALL RESPONSES TO PHQ9 QUESTIONS 1 & 2: 0
SUM OF ALL RESPONSES TO PHQ QUESTIONS 1-9: 0
2. FEELING DOWN, DEPRESSED OR HOPELESS: NOT AT ALL
SUM OF ALL RESPONSES TO PHQ QUESTIONS 1-9: 0

## 2025-01-09 ASSESSMENT — PAIN SCALES - GENERAL: PAINLEVEL_OUTOF10: 0

## 2025-01-09 NOTE — PROGRESS NOTES
[facility]    Radiation Oncology Weekly Progress Note  Encounter Date: 25     Robbin Waller  MRN: [id number]  : 1960       ICD-10-CM    1. Prostate cancer (HCC)  C61           Diagnosis   Favorable intermediate risk prostate cancer, [place] 3 + 4 (in a total of 3/12 template cores), PSA 2.98 ng/mL, cT2a, s/p SBRT, 7.25 Gy x 5, with prostate CTV boosted to 8 Gy x 5.    AJCC Staging has been reviewed.    Interval History   Mr. Waller is a 64 y.o. male seen today for his weekly on-treatment evaluation    2025: Doing well today.  Accompanied by his wife.  Seen after his second fraction of prostate SBRT.  We reviewed the expected short-term side effects of radiation and the follow-up plan.  I will send him tamsulosin in anticipation of urinary side effects.  We reviewed the appropriate use of tamsulosin and its side effects, including the occasional hypotension seen with the first few doses, how to identify this and manage it if so.  All questions answered.    Physical exam: NAD       Treatment Details:     Treatment Site Dose/Fx (cGy) #Fx Current Dose (cGy) Total Planned Dose (cGy) Start Date Most Recent Treatment Date   Prostate [phone] 25     Concurrent Therapy: No concurrent systemic therapy      Allergies and Medications   No Known Allergies    Current Outpatient Medications   Medication Instructions    diclofenac (VOLTAREN) 75 mg, Oral, 2 TIMES DAILY    fluticasone (FLONASE) 50 MCG/ACT nasal spray 2 sprays, Each Nostril, DAILY    losartan (COZAAR) 50 mg, Oral, 2 times daily    Multiple Vitamins-Minerals (CENTRUM SILVER 50+MEN PO) Oral, DAILY        Assessment & Plan   - Continue radiation treatment as planned  - Treatment setup and plan reviewed. Port images/CBCT images reviewed. Appropriate laboratory work was reviewed.   - Treatment side effects and toxicities reviewed with the patient, and appropriate management was advised as detailed above.

## 2025-01-13 ENCOUNTER — HOSPITAL ENCOUNTER (OUTPATIENT)
Facility: HOSPITAL | Age: 65
Discharge: HOME | End: 2025-01-16
Attending: RADIOLOGY

## 2025-01-13 LAB
RAD ONC ARIA COURSE FIRST TREATMENT DATE: NORMAL
RAD ONC ARIA COURSE ID: NORMAL
RAD ONC ARIA COURSE INTENT: NORMAL
RAD ONC ARIA COURSE LAST TREATMENT DATE: NORMAL
RAD ONC ARIA COURSE SESSION NUMBER: 3
RAD ONC ARIA COURSE START DATE: NORMAL
RAD ONC ARIA COURSE TREATMENT ELAPSED DAYS: 6
RAD ONC ARIA PLAN FRACTIONS TREATED TO DATE: 3
RAD ONC ARIA PLAN ID: NORMAL
RAD ONC ARIA PLAN PRESCRIBED DOSE PER FRACTION: 8 GY
RAD ONC ARIA PLAN PRIMARY REFERENCE POINT: NORMAL
RAD ONC ARIA PLAN TOTAL FRACTIONS PRESCRIBED: 5
RAD ONC ARIA PLAN TOTAL PRESCRIBED DOSE: 4000 CGY
RAD ONC ARIA REFERENCE POINT DOSAGE GIVEN TO DATE: 21.75 GY
RAD ONC ARIA REFERENCE POINT DOSAGE GIVEN TO DATE: 24 GY
RAD ONC ARIA REFERENCE POINT DOSAGE GIVEN TO DATE: 24.21 GY
RAD ONC ARIA REFERENCE POINT ID: NORMAL
RAD ONC ARIA REFERENCE POINT SESSION DOSAGE GIVEN: 7.25 GY
RAD ONC ARIA REFERENCE POINT SESSION DOSAGE GIVEN: 8 GY
RAD ONC ARIA REFERENCE POINT SESSION DOSAGE GIVEN: 8.07 GY

## 2025-01-15 ENCOUNTER — HOSPITAL ENCOUNTER (OUTPATIENT)
Facility: HOSPITAL | Age: 65
Discharge: HOME | End: 2025-01-18
Attending: RADIOLOGY

## 2025-01-15 LAB
RAD ONC ARIA COURSE FIRST TREATMENT DATE: NORMAL
RAD ONC ARIA COURSE ID: NORMAL
RAD ONC ARIA COURSE INTENT: NORMAL
RAD ONC ARIA COURSE LAST TREATMENT DATE: NORMAL
RAD ONC ARIA COURSE SESSION NUMBER: 4
RAD ONC ARIA COURSE START DATE: NORMAL
RAD ONC ARIA COURSE TREATMENT ELAPSED DAYS: 8
RAD ONC ARIA PLAN FRACTIONS TREATED TO DATE: 4
RAD ONC ARIA PLAN ID: NORMAL
RAD ONC ARIA PLAN PRESCRIBED DOSE PER FRACTION: 8 GY
RAD ONC ARIA PLAN PRIMARY REFERENCE POINT: NORMAL
RAD ONC ARIA PLAN TOTAL FRACTIONS PRESCRIBED: 5
RAD ONC ARIA PLAN TOTAL PRESCRIBED DOSE: 4000 CGY
RAD ONC ARIA REFERENCE POINT DOSAGE GIVEN TO DATE: 29 GY
RAD ONC ARIA REFERENCE POINT DOSAGE GIVEN TO DATE: 32 GY
RAD ONC ARIA REFERENCE POINT DOSAGE GIVEN TO DATE: 32.28 GY
RAD ONC ARIA REFERENCE POINT ID: NORMAL
RAD ONC ARIA REFERENCE POINT SESSION DOSAGE GIVEN: 7.25 GY
RAD ONC ARIA REFERENCE POINT SESSION DOSAGE GIVEN: 8 GY
RAD ONC ARIA REFERENCE POINT SESSION DOSAGE GIVEN: 8.07 GY

## 2025-01-17 ENCOUNTER — HOSPITAL ENCOUNTER (OUTPATIENT)
Facility: HOSPITAL | Age: 65
Discharge: HOME | End: 2025-01-20
Attending: RADIOLOGY

## 2025-01-17 ENCOUNTER — CLINICAL DOCUMENTATION (OUTPATIENT)
Facility: HOSPITAL | Age: 65
End: 2025-01-17

## 2025-01-17 DIAGNOSIS — C61 PROSTATE CANCER (HCC): Primary | ICD-10-CM

## 2025-01-17 LAB
RAD ONC ARIA COURSE FIRST TREATMENT DATE: NORMAL
RAD ONC ARIA COURSE ID: NORMAL
RAD ONC ARIA COURSE INTENT: NORMAL
RAD ONC ARIA COURSE LAST TREATMENT DATE: NORMAL
RAD ONC ARIA COURSE SESSION NUMBER: 5
RAD ONC ARIA COURSE START DATE: NORMAL
RAD ONC ARIA COURSE TREATMENT ELAPSED DAYS: 10
RAD ONC ARIA PLAN FRACTIONS TREATED TO DATE: 5
RAD ONC ARIA PLAN ID: NORMAL
RAD ONC ARIA PLAN PRESCRIBED DOSE PER FRACTION: 8 GY
RAD ONC ARIA PLAN PRIMARY REFERENCE POINT: NORMAL
RAD ONC ARIA PLAN TOTAL FRACTIONS PRESCRIBED: 5
RAD ONC ARIA PLAN TOTAL PRESCRIBED DOSE: 4000 CGY
RAD ONC ARIA REFERENCE POINT DOSAGE GIVEN TO DATE: 36.25 GY
RAD ONC ARIA REFERENCE POINT DOSAGE GIVEN TO DATE: 40 GY
RAD ONC ARIA REFERENCE POINT DOSAGE GIVEN TO DATE: 40.35 GY
RAD ONC ARIA REFERENCE POINT ID: NORMAL
RAD ONC ARIA REFERENCE POINT SESSION DOSAGE GIVEN: 7.25 GY
RAD ONC ARIA REFERENCE POINT SESSION DOSAGE GIVEN: 8 GY
RAD ONC ARIA REFERENCE POINT SESSION DOSAGE GIVEN: 8.07 GY

## 2025-01-30 DIAGNOSIS — R73.03 PREDIABETES: ICD-10-CM

## 2025-01-30 DIAGNOSIS — E78.00 HYPERCHOLESTEROLEMIA: ICD-10-CM

## 2025-01-30 DIAGNOSIS — I10 PRIMARY HYPERTENSION: ICD-10-CM

## 2025-01-30 NOTE — TELEPHONE ENCOUNTER
Last appointment: 1/15/24  Next appointment: none  Previous refill encounter(s): 1/15/24    Requested Prescriptions     Pending Prescriptions Disp Refills    losartan (COZAAR) 50 MG tablet [Pharmacy Med Name: LOSARTAN POT TAB 50MG] 180 tablet 0     Sig: Take 1 tablet by mouth in the morning and at bedtime Patient needs an appointment for further refills         For Pharmacy Admin Tracking Only    Program: Medication Refill  CPA in place:    Recommendation Provided To:   Intervention Detail: New Rx: 1, reason: Patient Preference  Intervention Accepted By:   Gap Closed?:    Time Spent (min): 5

## 2025-01-31 RX ORDER — LOSARTAN POTASSIUM 50 MG/1
50 TABLET ORAL 2 TIMES DAILY
Qty: 180 TABLET | Refills: 0 | Status: SHIPPED | OUTPATIENT
Start: 2025-01-31

## 2025-02-12 NOTE — PROGRESS NOTES
[facility] [place] [facility]   [facility]      RADIATION ONCOLOGY CLINICAL END OF TREATMENT NOTE    Patient Name: Robbin Waller  YOB: 1960  Medical Record Number: [id number]  Encounter Date: 1/17/2025     DIAGNOSIS AND STAGING:       ICD-10-CM    1. Prostate cancer (HCC)  C61          Cancer Staging   No matching staging information was found for the patient.    AJCC Staging has been reviewed    CLINICAL SUMMARY:   Favorable intermediate risk prostate cancer, [place] 3 + 4 (in a total of 3/12 template cores), PSA 2.98 ng/mL, cT2a, s/p SBRT, 7.25 Gy x 5, with prostate CTV boosted to 8 Gy x 5, completed 1/17/2025.       TREATMENT SUMMARY:     Course: C1    Treatment Site Ref. ID Energy Dose/Fx (cGy) #Fx Dose Correction (cGy) Total Dose (cGy) Start Date End Date Elapsed Days   SBRT PROSTATE SV [id number]_Pros_4000 6X 800 5 / 5 0 4,000 1/7/2025 1/17/2025 10       CONCURRENT THERAPY: None    TREATMENT COMPLETED:  Treatment completed as planned      CLINICAL COMMENTS:   Overall tolerated treatment well.  Tamsulosin prescribed in anticipation of urinary side-effects.       FOLLOW UP:   See Dr. Jimenez as scheduled. Return to this clinic 3 months later with PSA, or sooner as needed.       [facility]  [facility]  [address], [address], [place] [address]  P: [phone]  [facility] [address], [place] [address]  P: [phone]  [facility] [address], [place] [address]  P: [phone]

## 2025-02-25 DIAGNOSIS — I10 PRIMARY HYPERTENSION: ICD-10-CM

## 2025-02-25 DIAGNOSIS — R73.03 PREDIABETES: ICD-10-CM

## 2025-02-25 DIAGNOSIS — E78.00 HYPERCHOLESTEROLEMIA: ICD-10-CM

## 2025-02-25 NOTE — TELEPHONE ENCOUNTER
Patient requesting a refill on Losartan 50 mg tabs twice daily sent to [facility] [phone]. Patient can be reached at [phone]. Next  appointment  on 3/19/25

## 2025-02-26 RX ORDER — LOSARTAN POTASSIUM 50 MG/1
50 TABLET ORAL 2 TIMES DAILY
Qty: 60 TABLET | Refills: 0 | Status: SHIPPED | OUTPATIENT
Start: 2025-02-26

## 2025-03-16 SDOH — ECONOMIC STABILITY: INCOME INSECURITY: IN THE LAST 12 MONTHS, WAS THERE A TIME WHEN YOU WERE NOT ABLE TO PAY THE MORTGAGE OR RENT ON TIME?: NO

## 2025-03-16 SDOH — ECONOMIC STABILITY: FOOD INSECURITY: WITHIN THE PAST 12 MONTHS, YOU WORRIED THAT YOUR FOOD WOULD RUN OUT BEFORE YOU GOT MONEY TO BUY MORE.: NEVER TRUE

## 2025-03-16 SDOH — ECONOMIC STABILITY: FOOD INSECURITY: WITHIN THE PAST 12 MONTHS, THE FOOD YOU BOUGHT JUST DIDN'T LAST AND YOU DIDN'T HAVE MONEY TO GET MORE.: NEVER TRUE

## 2025-03-16 SDOH — ECONOMIC STABILITY: TRANSPORTATION INSECURITY
IN THE PAST 12 MONTHS, HAS THE LACK OF TRANSPORTATION KEPT YOU FROM MEDICAL APPOINTMENTS OR FROM GETTING MEDICATIONS?: NO

## 2025-03-19 ENCOUNTER — OFFICE VISIT (OUTPATIENT)
Age: 65
End: 2025-03-19
Payer: COMMERCIAL

## 2025-03-19 VITALS
WEIGHT: 217 LBS | RESPIRATION RATE: 16 BRPM | OXYGEN SATURATION: 94 % | HEIGHT: 70 IN | TEMPERATURE: 97.4 F | BODY MASS INDEX: 31.07 KG/M2 | HEART RATE: 65 BPM | DIASTOLIC BLOOD PRESSURE: 86 MMHG | SYSTOLIC BLOOD PRESSURE: 131 MMHG

## 2025-03-19 DIAGNOSIS — C61 PROSTATE CANCER (HCC): ICD-10-CM

## 2025-03-19 DIAGNOSIS — E78.00 HYPERCHOLESTEROLEMIA: ICD-10-CM

## 2025-03-19 DIAGNOSIS — I10 PRIMARY HYPERTENSION: Primary | ICD-10-CM

## 2025-03-19 DIAGNOSIS — R73.03 PREDIABETES: ICD-10-CM

## 2025-03-19 PROCEDURE — 3075F SYST BP GE 130 - 139MM HG: CPT | Performed by: FAMILY MEDICINE

## 2025-03-19 PROCEDURE — 99213 OFFICE O/P EST LOW 20 MIN: CPT | Performed by: FAMILY MEDICINE

## 2025-03-19 PROCEDURE — 3079F DIAST BP 80-89 MM HG: CPT | Performed by: FAMILY MEDICINE

## 2025-03-19 RX ORDER — LOSARTAN POTASSIUM 50 MG/1
50 TABLET ORAL 2 TIMES DAILY
Qty: 60 TABLET | Refills: 0 | Status: SHIPPED | OUTPATIENT
Start: 2025-03-19

## 2025-03-19 ASSESSMENT — PATIENT HEALTH QUESTIONNAIRE - PHQ9
SUM OF ALL RESPONSES TO PHQ QUESTIONS 1-9: 0
1. LITTLE INTEREST OR PLEASURE IN DOING THINGS: NOT AT ALL
2. FEELING DOWN, DEPRESSED OR HOPELESS: NOT AT ALL
SUM OF ALL RESPONSES TO PHQ QUESTIONS 1-9: 0

## 2025-03-19 NOTE — PROGRESS NOTES
Chief Complaint   Patient presents with    Hypertension     Follow up       \"Have you been to the ER, urgent care clinic since your last visit?  Hospitalized since your last visit?\"    NO    “Have you seen or consulted any other health care providers outside of [facility] since your last visit?”    NO      “Have you had a colorectal cancer screening such as a colonoscopy/FIT/Cologuard?    YES    No colonoscopy on file  No cologuard on file  No FIT/FOBT on file   No flexible sigmoidoscopy on file         Click Here for Release of Records Request     No results found for this visit on 25.   Vitals:    25 1206   BP: 131/86   BP Site: Left Upper Arm   Patient Position: Sitting   BP Cuff Size: Large Adult   Pulse: 65   Resp: 16   Temp: 97.4 °F (36.3 °C)   TempSrc: Infrared   SpO2: 94%   Weight: 98.4 kg (217 lb)   Height: 1.778 m (5' 10\")        The patient, Robbin Waller, identity was verified by name and .

## 2025-03-21 NOTE — PROGRESS NOTES
Robbin Waller (:  1960) is a 64 y.o. male,Established patient, here for evaluation of the following chief complaint(s):  Hypertension (Follow up)    HTN   pt also present for Bp check , compliant w/ the bp meds, a low salt diet, an  active life style, patient does obtain the bp at home and the average of 140/90, today the pt denies Chest Pain, has no legs swelling no lightheadedness,the pat has not been feeling anxious, and  Has not been feeling stressed out, otherwise feeling better since the last visit    Constitutional: no fever, nl energy levels, nl sleep patterns, nl appetite, and nl weight fluctuations,  - exercise habits,  nad     HENT: no ear pain or nosebleeds. No blurred vision  Respiratory: no shortness of breath, wheezing cough   Cardiovascular: Has no chest pain, ,and racing heart .   Gastrointestinal: No constipation, diarrhea, nausea and vomiting.   Genitourinary: No frequency.   Musculoskeletal: Negative for joint pain.   Skin: no itching, no rash.   Neurological: Negative for dizziness, no tremors  Psychiatric/Behavioral: no for depression  no nervous/anxious, nl stress levels, and overall emotional well-being .      Constitutional: Well developed, well nourished.  non-toxic in appearance, not diaphoretic.   HEENT: PERRL. EOMI. The left TM is unremarkable. The right TM is unremarkable. No nasal  erythema noted.  THROAT: Posterior pharynx has no erythema, no exudates.    Neck:  no cervical lymphadenopathy. Neck is supple   Cardiovascular: Regular rate and rhythm, no murmurs, rubs, or gallops.   Pulmonary: Clear to auscultation bilaterally. Has no wheezing, rales or rhonchi.,  speaking in full sentences, has no accessory muscle used.  Abdomen: Bowel sounds are normal. Having no distension, no palpable mass. Soft,  No tenderness, rebound or guarding.   Musculoskeletal: No peripheral edema, having normal ROM  Skin:   warm and dry. No diaphoresis, rashes, or lesions.   Neurological:

## 2025-03-21 NOTE — ASSESSMENT & PLAN NOTE
Monitored by specialist- no acute findings meriting change in the plan    Orders:    Hemoglobin A1C; Future    PSA Screening; Future

## 2025-07-08 DIAGNOSIS — I10 PRIMARY HYPERTENSION: ICD-10-CM

## 2025-07-08 DIAGNOSIS — E78.00 HYPERCHOLESTEROLEMIA: ICD-10-CM

## 2025-07-08 DIAGNOSIS — R73.03 PREDIABETES: ICD-10-CM

## 2025-07-08 NOTE — TELEPHONE ENCOUNTER
Last appointment: 3/19/25  Next appointment: none  Previous refill encounter(s): 3/19/25 #60    Requested Prescriptions     Pending Prescriptions Disp Refills    losartan (COZAAR) 50 MG tablet 180 tablet 1     Sig: Take 1 tablet by mouth in the morning and at bedtime         For Pharmacy Admin Tracking Only    Program: Medication Refill  CPA in place:    Recommendation Provided To:   Intervention Detail: New Rx: 1, reason: Patient Preference  Intervention Accepted By:   Gap Closed?:    Time Spent (min): 5

## 2025-07-09 RX ORDER — LOSARTAN POTASSIUM 50 MG/1
50 TABLET ORAL 2 TIMES DAILY
Qty: 180 TABLET | Refills: 1 | Status: SHIPPED | OUTPATIENT
Start: 2025-07-09

## 2025-07-23 ENCOUNTER — HOSPITAL ENCOUNTER (OUTPATIENT)
Facility: HOSPITAL | Age: 65
Discharge: HOME | End: 2025-07-26

## 2025-07-23 VITALS
DIASTOLIC BLOOD PRESSURE: 85 MMHG | WEIGHT: 210 LBS | BODY MASS INDEX: 30.06 KG/M2 | SYSTOLIC BLOOD PRESSURE: 133 MMHG | HEIGHT: 70 IN | HEART RATE: 70 BPM

## 2025-07-23 DIAGNOSIS — C61 PROSTATE CANCER (HCC): Primary | ICD-10-CM

## 2025-07-23 ASSESSMENT — PAIN SCALES - GENERAL: PAINLEVEL_OUTOF10: 0

## 2025-07-23 NOTE — CONSULTS
RADIATION ONCOLOGY OFFICE NOTE    Patient Name: Robbin Waller  Patient YOB: 1960   Medical Record Number: [id number]  Referring Physician: Harris Acuña MD  [address]  [address]  [place] [id number]  Primary Care Provider: Francesco Perez MD    DIAGNOSIS & STAGING: Cancer Staging   No matching staging information was found for the patient.      ICD-10-CM    1. Prostate cancer (HCC)  C61         AJCC Staging has been reviewed      CHIEF COMPLAINT: Favorable intermediate risk prostate cancer, [place] 3 + 4 (in a total of 3/12 template cores), PSA 2.98 ng/mL, cT2a, s/p SBRT, 7.25 Gy x 5, with prostate CTV boosted to 8 Gy x 5, completed 1/17/2025.     RADIATION HISTORY:  Course: C1     Treatment Site Ref. ID Energy Dose/Fx (cGy) #Fx Dose Correction (cGy) Total Dose (cGy) Start Date End Date Elapsed Days   SBRT PROSTATE SV CTV_Pros_4000 6X 800 5 / 5 0 4,000 1/7/2025 1/17/2025 10       RETURN VISITS:    7/23/25: Today is approximately 6 months after the completion of radiation. His PSA is 0.997 ng/mL from 7/11/2025. He denies blood in urine or stool. His urinary symptoms are at his preradiation baseline.  He is no longer taking tamsulosin, he only needed it for a month or 2 after radiation he says.  IPSS = 3, QOL = 0/delighted. JAYA = 21.  He has not noticed any worsening of his erections since radiation.  He continues to take Cialis as needed and is getting good results with that.  He denies GI complaints.  He next sees Dr. Jimenez 10/23/2025.      HISTORY OF PRESENT ILLNESS AT CONSULT:      Mr. Waller is a 64 year old man with HTN, ED on Cialis, Dupuytren's contracture, and a new diagnosis of prostate cancer. He was referred to urological care for elevated PSA, most recently measured prior to consult as 4.41 ng/mL on 6/10/24.    MRI 4/11/24 demonstrated a 27 cc prostate, no clinically significant prostate cancer seen. I reviewed this MRI personally and agree with the radiology report.    He was

## 2025-08-07 DIAGNOSIS — I10 PRIMARY HYPERTENSION: ICD-10-CM

## 2025-08-07 DIAGNOSIS — E78.00 HYPERCHOLESTEROLEMIA: ICD-10-CM

## 2025-08-07 DIAGNOSIS — R73.03 PREDIABETES: ICD-10-CM

## 2025-08-08 RX ORDER — LOSARTAN POTASSIUM 50 MG/1
50 TABLET ORAL 2 TIMES DAILY
Qty: 60 TABLET | OUTPATIENT
Start: 2025-08-08

## 2025-08-22 DIAGNOSIS — E78.00 HYPERCHOLESTEROLEMIA: ICD-10-CM

## 2025-08-22 DIAGNOSIS — R73.03 PREDIABETES: ICD-10-CM

## 2025-08-22 DIAGNOSIS — I10 PRIMARY HYPERTENSION: ICD-10-CM

## 2025-08-22 RX ORDER — LOSARTAN POTASSIUM 50 MG/1
50 TABLET ORAL 2 TIMES DAILY
Qty: 180 TABLET | Refills: 1 | Status: SHIPPED | OUTPATIENT
Start: 2025-08-22